# Patient Record
Sex: MALE | Race: WHITE | URBAN - METROPOLITAN AREA
[De-identification: names, ages, dates, MRNs, and addresses within clinical notes are randomized per-mention and may not be internally consistent; named-entity substitution may affect disease eponyms.]

---

## 2023-12-29 ENCOUNTER — OFFICE VISIT (OUTPATIENT)
Dept: OTOLARYNGOLOGY | Facility: CLINIC | Age: 43
End: 2023-12-29

## 2023-12-29 VITALS — BODY MASS INDEX: 31.08 KG/M2 | WEIGHT: 250 LBS | HEIGHT: 75 IN

## 2023-12-29 DIAGNOSIS — H93.13 BILATERAL TINNITUS: Primary | ICD-10-CM

## 2023-12-29 DIAGNOSIS — J34.2 NASAL SEPTAL DEVIATION: ICD-10-CM

## 2023-12-29 DIAGNOSIS — T70.29XA BAROTRAUMA DUE TO DIVING: ICD-10-CM

## 2023-12-29 PROCEDURE — 99203 OFFICE O/P NEW LOW 30 MIN: CPT | Performed by: SPECIALIST

## 2023-12-29 PROCEDURE — 3008F BODY MASS INDEX DOCD: CPT | Performed by: SPECIALIST

## 2023-12-29 RX ORDER — MESALAMINE 1.2 G/1
4 TABLET, DELAYED RELEASE ORAL
COMMUNITY
Start: 2023-12-04

## 2023-12-29 RX ORDER — METHYLPREDNISOLONE 4 MG/1
TABLET ORAL
Qty: 21 TABLET | Refills: 0 | Status: SHIPPED | OUTPATIENT
Start: 2023-12-29

## 2024-01-03 ENCOUNTER — TELEPHONE (OUTPATIENT)
Dept: OTOLARYNGOLOGY | Facility: CLINIC | Age: 44
End: 2024-01-03

## 2024-01-03 ENCOUNTER — OFFICE VISIT (OUTPATIENT)
Dept: AUDIOLOGY | Facility: CLINIC | Age: 44
End: 2024-01-03

## 2024-01-03 DIAGNOSIS — H93.19 TINNITUS, UNSPECIFIED LATERALITY: Primary | ICD-10-CM

## 2024-01-03 PROCEDURE — 92557 COMPREHENSIVE HEARING TEST: CPT | Performed by: AUDIOLOGIST

## 2024-01-03 PROCEDURE — 92567 TYMPANOMETRY: CPT | Performed by: AUDIOLOGIST

## 2024-01-05 ENCOUNTER — OFFICE VISIT (OUTPATIENT)
Dept: OTOLARYNGOLOGY | Facility: CLINIC | Age: 44
End: 2024-01-05

## 2024-01-05 DIAGNOSIS — R09.81 NASAL CONGESTION: ICD-10-CM

## 2024-01-05 DIAGNOSIS — J34.2 DEVIATED NASAL SEPTUM: ICD-10-CM

## 2024-01-05 DIAGNOSIS — J30.1 ALLERGIC RHINITIS DUE TO POLLEN, UNSPECIFIED SEASONALITY: ICD-10-CM

## 2024-01-05 DIAGNOSIS — T70.29XA BAROTRAUMA DUE TO DIVING: ICD-10-CM

## 2024-01-05 DIAGNOSIS — J34.2 NASAL SEPTAL DEVIATION: ICD-10-CM

## 2024-01-05 DIAGNOSIS — H69.93 DYSFUNCTION OF BOTH EUSTACHIAN TUBES: ICD-10-CM

## 2024-01-05 DIAGNOSIS — H93.13 BILATERAL TINNITUS: Primary | ICD-10-CM

## 2024-01-05 PROCEDURE — 31231 NASAL ENDOSCOPY DX: CPT | Performed by: STUDENT IN AN ORGANIZED HEALTH CARE EDUCATION/TRAINING PROGRAM

## 2024-01-05 PROCEDURE — 99214 OFFICE O/P EST MOD 30 MIN: CPT | Performed by: STUDENT IN AN ORGANIZED HEALTH CARE EDUCATION/TRAINING PROGRAM

## 2024-01-05 RX ORDER — AZELASTINE 1 MG/ML
2 SPRAY, METERED NASAL 2 TIMES DAILY
Qty: 30 ML | Refills: 3 | Status: SHIPPED | OUTPATIENT
Start: 2024-01-05

## 2024-01-05 RX ORDER — FLUTICASONE PROPIONATE 50 MCG
2 SPRAY, SUSPENSION (ML) NASAL 2 TIMES DAILY
Qty: 16 G | Refills: 3 | Status: SHIPPED | OUTPATIENT
Start: 2024-01-05

## 2024-01-05 RX ORDER — PREDNISONE 5 MG/1
TABLET ORAL
Qty: 35 TABLET | Refills: 0 | Status: SHIPPED | OUTPATIENT
Start: 2024-01-05 | End: 2024-01-19

## 2024-01-05 NOTE — PROGRESS NOTES
[place]  OTOLARYNGOLOGY - HEAD & NECK SURGERY    1/5/2024     Reason for Consultation:   Ear pressure, popping, crackling, tinnitus    History of Present Illness:   Patient is a pleasant 43 year old male who is being seen for significant issues with his bilateral ears consisting of bilateral ear pressure, tinnitus, popping and crackling sensation, over the last 8 months.  He states that the symptoms followed an event during scuba diving where he had severe pressure and attempted to equalize pressure when the water leading to pain and subsequent eustachian tube issues.  He was seen by Dr. Ortiz recently and had audiogram which revealed normal hearing, as well as type A tympanograms with mildly negative pressures.  He states his symptoms are significantly impacting his quality of life, and is looking for further treatment for his issue.  Aside from this he has had consistent issues with nasal congestion, postnasal drip, and has had a previous septoplasty but states his symptoms have persisted.  He also has previously been tested for seasonal allergies and does have an allergy to dust mites occluding other seasonal allergies.  He is not currently on any nasal sprays    Past Medical History  No past medical history on file.    Past Surgical History  Past Surgical History:   Procedure Laterality Date    OTHER SURGICAL HISTORY  2013    deviated septum repair    SPINAL FUSION         Family History  No family history on file.    Social History  Pediatric History   Patient Parents    Not on file     Other Topics Concern    Not on file   Social History Narrative    Not on file           Current Medications:  Current Outpatient Medications   Medication Sig Dispense Refill    mesalamine 1.2 g Oral Tab EC Take 4 tablets (4.8 g total) by mouth daily with breakfast.      methylPREDNISolone 4 MG Oral Tablet Therapy Pack Take by oral route. 21 tablet 0       Allergies  No Known Allergies    Review of Systems:   A comprehensive 10  point review of systems was completed.  Pertinent positives and negatives noted in the the HPI.    Physical Exam:   There were no vitals taken for this visit.    GENERAL: No acute distress, Comfortable appearing  FACE: HB 1/6, Normal Animation  HEAD: Normocephalic  EYES: EOMI, pupils equil  EARS: Bilateral Auricles Symmetric, bilateral tympanic membranes normal  NOSE: Nares patent bilaterally  ORAL CAVITY: Tongue mobile, Oropharynx clear, Floor of mouth clear, Posterior oropharynx normal  NECK: No palpable lymphadenopathy, thyroid not palpable, nontender    PROCEDURE: BILATERAL RIGID NASAL ENDOSCOPY  Bilateral rigid nasal endoscopy (09477) was performed. Verbal consent was obtained from the patient to proceed with rigid nasal endoscopy. The nasal cavity was decongested and topically anesthetized with a combination of Oxymetazoline and 4% Lidocaine. A rigid 4mm 30 degree nasal endoscope connected to a high-definition endoscopy tower was used to examine both nasal cavities. Digital photos and/or videos of relevant exam findings were obtained. The inferior meatus, inferior turbinate, nasopharynx, middle meatus, middle turbinate, superior meatus, superior turbinate, and sphenoethmoidal recess were examined bilaterally, with any exceptions as noted below. At the completion of the procedure the endoscope was removed. The patient tolerated the procedure well. There were no complications.    Findings: The bilateral inferior turbinates were mildly enlarged. The Septum was deviated to the right. The middle meatus was patent bilaterally. There were no obvious masses or polyps noted.      Results:     Laboratory Data:  No results found for: \"WBC\", \"HGB\", \"HCT\", \"PLT\", \"CREATSERUM\", \"BUN\", \"NA\", \"K\", \"CL\", \"CO2\", \"GLU\", \"CA\", \"ALB\", \"ALKPHO\", \"TP\", \"AST\", \"ALT\", \"PTT\", \"INR\", \"PTP\", \"T4F\", \"TSH\", \"TSHREFLEX\", \"RISHI\", \"LIP\", \"GGT\", \"PSA\", \"DDIMER\", \"ESRML\", \"ESRPF\", \"CRP\", \"BNP\", \"MG\", \"PHOS\", \"TROP\", \"CK\", \"CKMB\", \"GUSTAVO\", \"RPR\",  \"B12\", \"ETOH\", \"POCGLU\"      Imaging:  No results found.      Impression:   Bilateral eustachian tube dysfunction  Bilateral tenderness  Bilateral otalgia  Allergic rhinitis  Deviated nasal septum  Nasal congestion    Recommendations:  I will treat him with a longer course of prednisone taper for 2 weeks  Will start him on a combination of Flonase and azelastine  I discussed with the patient at length bilateral eustachian tube dilation under general anesthesia.  I stated that this may not solve his issues and he may have persistence of his symptoms but is an option to address his symptoms which include popping and crackling as well as inability to regulate ear pressure effectively.  He would like to trial any further medical therapy and if he does not feel better we will proceed with bilateral eustachian tube dilation under general anesthesia in the operating room.     Thank you for allowing me to participate in the care of your patient.    Serjio Trammell, DO   Otolaryngology/Rhinology, Sinus, and Endoscopic Skull Base Surgery  [address] [place] [address]  Phone [phone]  Fax [phone]  1/5/2024  12:44 PM  1/5/2024

## 2024-01-10 ENCOUNTER — TELEPHONE (OUTPATIENT)
Dept: OTOLARYNGOLOGY | Facility: CLINIC | Age: 44
End: 2024-01-10

## 2024-01-10 DIAGNOSIS — H69.93 ETD (EUSTACHIAN TUBE DYSFUNCTION), BILATERAL: Primary | ICD-10-CM

## 2024-01-12 ENCOUNTER — PATIENT MESSAGE (OUTPATIENT)
Dept: OTOLARYNGOLOGY | Facility: CLINIC | Age: 44
End: 2024-01-12

## 2024-01-15 NOTE — TELEPHONE ENCOUNTER
CIERA Graham, please address.    Hi Dr Trammell,     The Prednisone that I was prescribed seemed to somewhat work after the first 24 hours, I saw a slight improvement, but since then I have not seen much improvement so I am eager to get the balloon dilation procedure done as we discussed.      The problem is that I have not been able to get it scheduled. You said to call if I didn’t hear from anyone by Wednesday, which I didn’t. So I called on Wednesday, Thursday, and today and still have not heard back from your scheduling staff since we meet one week ago. Yesterday I was promised a call by week end but still nothing.      I recall you glanced at your schedule and thought you’d be able to get me in rather quickly, within a week or two, but I can’t do so unless someone schedules it.      I apologize for being a pest but I didn’t know who else to contact. I’m hoping your staff reads this message and gives whoever is responsible for scheduling me a nudge. As I said during my visit, I need to travel internationally for the period of a year beginning at the end of the month and it completely hinges on the health of my ears. I need to get in next week or the following week at the latest for the procedure.      In the meantime, since I haven’t seen much improvement with the Prednisone, let me know if there’s a different medication that we can try, or even if a stronger dosage of Prednisone is an option.      Thank you  Anand  
Message sent to patient.    Hi Anand,    I am sorry you are not feeling well and that no one has contacted you.    I have let our surgery scheduler know about your situation and hopefully she will be in contact with you real soon.    I also have let Dr. Trammell know of your situation to see what direction he can point you towards while we wait to schedule your surgery.    I am sorry for the inconvenience.    If you have any other questions or concerns, please send a [facility] [facility] message or call our office at [phone].    Kind Regards,  Doreen BENITEZ  
no

## 2024-01-15 NOTE — TELEPHONE ENCOUNTER
Patient is scheduled on 1/22/24 at [facility] with Dr. Trammell.   Information: Selecting Yes will display possible errors in your note based on the variables you have selected. This validation is only offered as a suggestion for you. PLEASE NOTE THAT THE VALIDATION TEXT WILL BE REMOVED WHEN YOU FINALIZE YOUR NOTE. IF YOU WANT TO FAX A PRELIMINARY NOTE YOU WILL NEED TO TOGGLE THIS TO 'NO' IF YOU DO NOT WANT IT IN YOUR FAXED NOTE.

## 2024-01-16 PROBLEM — G47.33 OBSTRUCTIVE SLEEP APNEA SYNDROME: Status: ACTIVE | Noted: 2019-12-10

## 2024-01-25 ENCOUNTER — OFFICE VISIT (OUTPATIENT)
Dept: OTOLARYNGOLOGY | Facility: CLINIC | Age: 44
End: 2024-01-25

## 2024-01-25 VITALS — BODY MASS INDEX: 32.33 KG/M2 | WEIGHT: 260 LBS | HEIGHT: 75 IN

## 2024-01-25 DIAGNOSIS — H69.93 ETD (EUSTACHIAN TUBE DYSFUNCTION), BILATERAL: Primary | ICD-10-CM

## 2024-01-25 DIAGNOSIS — T70.29XA BAROTRAUMA DUE TO DIVING: ICD-10-CM

## 2024-01-25 DIAGNOSIS — H93.13 BILATERAL TINNITUS: ICD-10-CM

## 2024-01-25 PROCEDURE — 99024 POSTOP FOLLOW-UP VISIT: CPT | Performed by: STUDENT IN AN ORGANIZED HEALTH CARE EDUCATION/TRAINING PROGRAM

## 2024-01-25 PROCEDURE — 3008F BODY MASS INDEX DOCD: CPT | Performed by: STUDENT IN AN ORGANIZED HEALTH CARE EDUCATION/TRAINING PROGRAM

## 2024-01-25 RX ORDER — PSYLLIUM HUSK 0.4 G
CAPSULE ORAL AS DIRECTED
COMMUNITY

## 2024-01-25 NOTE — PROGRESS NOTES
[place]  OTOLARYNGOLOGY - HEAD & NECK SURGERY    1/25/2024     Reason for Consultation:   Ear pressure, popping, crackling, tinnitus    History of Present Illness:   Patient is a pleasant 43 year old male who is being seen for significant issues with his bilateral ears consisting of bilateral ear pressure, tinnitus, popping and crackling sensation, over the last 8 months.  He states that the symptoms followed an event during scuba diving where he had severe pressure and attempted to equalize pressure when the water leading to pain and subsequent eustachian tube issues.  He was seen by Dr. Ortiz recently and had audiogram which revealed normal hearing, as well as type A tympanograms with mildly negative pressures.  He states his symptoms are significantly impacting his quality of life, and is looking for further treatment for his issue.  Aside from this he has had consistent issues with nasal congestion, postnasal drip, and has had a previous septoplasty but states his symptoms have persisted.  He also has previously been tested for seasonal allergies and does have an allergy to dust mites occluding other seasonal allergies.  He is not currently on any nasal sprays    INTERVAL HISTORY  1/23/2024: Underwent bilateral eustachian tube dilation in OR   1/25/2024: Patient returns today and initially felt better after eustachian tube dilation, however after a few hours he again had fullness in the ears with popping and crackling.     Past Medical History  Past Medical History:   Diagnosis Date    Sleep apnea        Past Surgical History  Past Surgical History:   Procedure Laterality Date    OTHER SURGICAL HISTORY  2013    deviated septum repair    SPINAL FUSION         Family History  No family history on file.    Social History  Pediatric History   Patient Parents    Not on file     Other Topics Concern    Not on file   Social History Narrative    Not on file           Current Medications:  Current Outpatient Medications    Medication Sig Dispense Refill    lamoTRIgine 200 MG Oral Tab Take 200 mg by mouth daily.      azelastine 0.1 % Nasal Solution 2 sprays by Nasal route 2 (two) times daily. 30 mL 3    fluticasone propionate 50 MCG/ACT Nasal Suspension 2 sprays by Nasal route 2 (two) times daily. 16 g 3    mesalamine 1.2 g Oral Tab EC Take 4 tablets (4.8 g total) by mouth daily with breakfast.      methylPREDNISolone 4 MG Oral Tablet Therapy Pack Take by oral route. 21 tablet 0       Allergies  No Known Allergies    Review of Systems:   A comprehensive 10 point review of systems was completed.  Pertinent positives and negatives noted in the the HPI.    Physical Exam:   There were no vitals taken for this visit.    GENERAL: No acute distress, Comfortable appearing  FACE: HB 1/6, Normal Animation  HEAD: Normocephalic  EYES: EOMI, pupils equil  EARS: Bilateral Auricles Symmetric, bilateral tympanic membranes normal  NOSE: Nares patent bilaterally  ORAL CAVITY: Tongue mobile, Oropharynx clear, Floor of mouth clear, Posterior oropharynx normal  NECK: No palpable lymphadenopathy, thyroid not palpable, nontender    PROCEDURE: BILATERAL RIGID NASAL ENDOSCOPY  Bilateral rigid nasal endoscopy (23552) was performed. Verbal consent was obtained from the patient to proceed with rigid nasal endoscopy. The nasal cavity was decongested and topically anesthetized with a combination of Oxymetazoline and 4% Lidocaine. A rigid 4mm 30 degree nasal endoscope connected to a high-definition endoscopy tower was used to examine both nasal cavities. Digital photos and/or videos of relevant exam findings were obtained. The inferior meatus, inferior turbinate, nasopharynx, middle meatus, middle turbinate, superior meatus, superior turbinate, and sphenoethmoidal recess were examined bilaterally, with any exceptions as noted below. At the completion of the procedure the endoscope was removed. The patient tolerated the procedure well. There were no  complications.    Findings: The bilateral inferior turbinates were mildly enlarged. The Septum was deviated to the right. The middle meatus was patent bilaterally. There were no obvious masses or polyps noted.      Results:     Laboratory Data:  No results found for: \"WBC\", \"HGB\", \"HCT\", \"PLT\", \"CREATSERUM\", \"BUN\", \"NA\", \"K\", \"CL\", \"CO2\", \"GLU\", \"CA\", \"ALB\", \"ALKPHO\", \"TP\", \"AST\", \"ALT\", \"PTT\", \"INR\", \"PTP\", \"T4F\", \"TSH\", \"TSHREFLEX\", \"RISHI\", \"LIP\", \"GGT\", \"PSA\", \"DDIMER\", \"ESRML\", \"ESRPF\", \"CRP\", \"BNP\", \"MG\", \"PHOS\", \"TROP\", \"CK\", \"CKMB\", \"GUSTAVO\", \"RPR\", \"B12\", \"ETOH\", \"POCGLU\"      Imaging:  No results found.      Impression:   Bilateral eustachian tube dysfunction  Bilateral tenderness  Bilateral otalgia  Allergic rhinitis  Deviated nasal septum  Nasal congestion    Recommendations:  Patient is status post bilateral eustachian tube dilation in OR on 1/23/24   I discussed with him teeth grinding may be an issue and he should be fitted for a dental guard  He is getting a 3rd opinion with another ENT next week which I discussed with him is a good option    Thank you for allowing me to participate in the care of your patient.    Serjio Trammell,    Otolaryngology/Rhinology, Sinus, and Endoscopic Skull Base Surgery  [facility] [address] [place] [address]  Phone [phone]  Fax [phone]  1/5/2024  12:44 PM  1/25/2024

## 2024-01-26 ENCOUNTER — PATIENT MESSAGE (OUTPATIENT)
Dept: OTOLARYNGOLOGY | Facility: CLINIC | Age: 44
End: 2024-01-26

## 2024-02-02 RX ORDER — PREDNISONE 20 MG/1
40 TABLET ORAL DAILY
Qty: 10 TABLET | Refills: 0 | Status: SHIPPED | OUTPATIENT
Start: 2024-02-02 | End: 2024-02-07

## 2024-02-02 RX ORDER — CELECOXIB 200 MG/1
200 CAPSULE ORAL DAILY PRN
Qty: 30 CAPSULE | Refills: 0 | Status: SHIPPED | OUTPATIENT
Start: 2024-02-02 | End: 2024-03-03

## 2024-02-02 NOTE — TELEPHONE ENCOUNTER
From: óGmez Morris  To: Serjio Trammell  Sent: 1/26/2024 3:02 PM CST  Subject: Prednisone and Celebrex Prescriptions     Dr Trammell,    During my visit yesterday you said you would put in a prescription for a 2x stronger dosage of the Prednisone and a rx for Celebrex. I have not seen those prescriptions come through to my pharmacy. Please advise.    Thank you  Anand